# Patient Record
Sex: MALE | Employment: FULL TIME | URBAN - METROPOLITAN AREA
[De-identification: names, ages, dates, MRNs, and addresses within clinical notes are randomized per-mention and may not be internally consistent; named-entity substitution may affect disease eponyms.]

---

## 2017-05-17 ENCOUNTER — TELEPHONE (OUTPATIENT)
Dept: NEUROLOGY | Facility: CLINIC | Age: 47
End: 2017-05-17

## 2017-05-18 ENCOUNTER — TELEPHONE (OUTPATIENT)
Dept: NEUROLOGY | Facility: CLINIC | Age: 47
End: 2017-05-18

## 2017-05-18 ENCOUNTER — OFFICE VISIT (OUTPATIENT)
Dept: NEUROLOGY | Facility: CLINIC | Age: 47
End: 2017-05-18

## 2017-05-18 VITALS
BODY MASS INDEX: 29.82 KG/M2 | RESPIRATION RATE: 18 BRPM | SYSTOLIC BLOOD PRESSURE: 144 MMHG | HEIGHT: 73 IN | DIASTOLIC BLOOD PRESSURE: 68 MMHG | HEART RATE: 76 BPM | WEIGHT: 225 LBS | OXYGEN SATURATION: 96 %

## 2017-05-18 DIAGNOSIS — M50.90 CERVICAL DISC DISEASE: ICD-10-CM

## 2017-05-18 DIAGNOSIS — M47.892 OTHER OSTEOARTHRITIS OF SPINE, CERVICAL REGION: ICD-10-CM

## 2017-05-18 DIAGNOSIS — M54.12 CERVICAL RADICULOPATHY: ICD-10-CM

## 2017-05-18 DIAGNOSIS — M54.2 NECK PAIN: Primary | ICD-10-CM

## 2017-05-18 PROBLEM — M47.812 CERVICAL SPONDYLOSIS: Status: ACTIVE | Noted: 2017-05-18

## 2017-05-18 PROCEDURE — 99204 OFFICE O/P NEW MOD 45 MIN: CPT | Performed by: PHYSICAL MEDICINE & REHABILITATION

## 2017-05-18 RX ORDER — IBUPROFEN 600 MG/1
600 TABLET ORAL EVERY 6 HOURS PRN
COMMUNITY

## 2017-05-18 RX ORDER — ALBUTEROL SULFATE 90 UG/1
AEROSOL, METERED RESPIRATORY (INHALATION) EVERY 6 HOURS PRN
COMMUNITY

## 2017-05-18 NOTE — TELEPHONE ENCOUNTER
AIM on-line for Authorization of Approval for MRI C-spine, Approval was given with Auth #[id number] valid until 06/16/2017, will call patient to inform of the Approval for PT / MRI, patient stated that he would like try PT first and see if there's improveme

## 2017-05-18 NOTE — PATIENT INSTRUCTIONS
Refill policies:    • Allow 2 business days for refills; controlled substances may take longer.   • Contact your pharmacy at least 5 days prior to running out of medication and have them send an electronic request or submit request through the “request re your physician has recommended that you have a procedure or additional testing performed. [facility] will contact your insurance carrier to obtain pre-certification or prior authorization.     Unfortunately, SAIMA has seen an increas
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
## 2017-08-24 ENCOUNTER — OFFICE VISIT (OUTPATIENT)
Dept: NEUROLOGY | Facility: CLINIC | Age: 47
End: 2017-08-24

## 2017-08-24 VITALS
DIASTOLIC BLOOD PRESSURE: 78 MMHG | OXYGEN SATURATION: 93 % | RESPIRATION RATE: 18 BRPM | HEIGHT: 73 IN | BODY MASS INDEX: 30.22 KG/M2 | WEIGHT: 228 LBS | SYSTOLIC BLOOD PRESSURE: 120 MMHG | HEART RATE: 78 BPM

## 2017-08-24 DIAGNOSIS — M50.90 CERVICAL DISC DISEASE: Primary | ICD-10-CM

## 2017-08-24 DIAGNOSIS — M47.892 OTHER OSTEOARTHRITIS OF SPINE, CERVICAL REGION: ICD-10-CM

## 2017-08-24 DIAGNOSIS — M54.12 CERVICAL RADICULOPATHY: ICD-10-CM

## 2017-08-24 PROCEDURE — 99214 OFFICE O/P EST MOD 30 MIN: CPT | Performed by: PHYSICAL MEDICINE & REHABILITATION

## 2017-08-24 RX ORDER — TRAMADOL HYDROCHLORIDE 50 MG/1
50 TABLET ORAL EVERY 8 HOURS PRN
Qty: 30 TABLET | Refills: 0 | Status: SHIPPED | OUTPATIENT
Start: 2017-08-24 | End: 2017-09-28

## 2017-08-24 NOTE — PATIENT INSTRUCTIONS
Refill policies:    • Allow 2 business days for refills; controlled substances may take longer.   • Contact your pharmacy at least 5 days prior to running out of medication and have them send an electronic request or submit request through the “request re your physician has recommended that you have a procedure or additional testing performed. [facility] will contact your insurance carrier to obtain pre-certification or prior authorization.     Unfortunately, SAIMA has seen an increas

## 2017-08-24 NOTE — PROGRESS NOTES
HPI:   Chirag Ferrell is a 52year old male. Patient presents with:  Neck Pain: LOV 05/18/17 Pt having right sided neck pain. Pt stated that it feels like someone stabbing him. Pt right shoulder strain in June., limited motion now improving.  Pt taking ibup and vomiting. Genitourinary:        Denies any loss of bowel or bladder     Musculoskeletal: Positive for neck pain. Skin: Negative for itching and rash. Neurological: Negative for tingling and sensory change.    Psychiatric/Behavioral: Negative for d There are no rashes or lesions. Lymph Nodes:  The patient has no palpable submandibular, supraclavicular, and cervical lymph nodes    Cervical Spine:    Posture: mild chin forward superiorly rotated protracted shoulder posture.    Shoulders: Level   Head months    Spent 35 minutes face to face    Discussed plan with Beau Dorman and understands the plan, patient has no further questions.      JUAN Shields  8/24/2017  4:44 PM

## 2017-08-25 ENCOUNTER — TELEPHONE (OUTPATIENT)
Dept: NEUROLOGY | Facility: CLINIC | Age: 47
End: 2017-08-25

## 2017-08-25 NOTE — TELEPHONE ENCOUNTER
AIM Online for authorization of approval for MRI C-spine wo. Approval was given with Authorization # [id number] effective 08/25/17 to 09/25/17. Will call Pt. to inform. Pt. Informed of approval. Scheduled MRI on 09/06/17 at [facility].

## 2017-09-06 ENCOUNTER — TELEPHONE (OUTPATIENT)
Dept: NEUROLOGY | Facility: CLINIC | Age: 47
End: 2017-09-06

## 2017-09-06 ENCOUNTER — HOSPITAL ENCOUNTER (OUTPATIENT)
Dept: MRI IMAGING | Age: 47
Discharge: HOME | End: 2017-09-06
Attending: NURSE PRACTITIONER
Payer: COMMERCIAL

## 2017-09-06 DIAGNOSIS — M47.892 OTHER OSTEOARTHRITIS OF SPINE, CERVICAL REGION: ICD-10-CM

## 2017-09-06 DIAGNOSIS — M50.90 CERVICAL DISC DISEASE: ICD-10-CM

## 2017-09-06 DIAGNOSIS — M54.12 CERVICAL RADICULOPATHY: ICD-10-CM

## 2017-09-06 PROCEDURE — 72141 MRI NECK SPINE W/O DYE: CPT | Performed by: NURSE PRACTITIONER

## 2017-09-08 PROBLEM — M48.02 FORAMINAL STENOSIS OF CERVICAL REGION: Status: ACTIVE | Noted: 2017-09-08

## 2017-09-09 NOTE — TELEPHONE ENCOUNTER
I reviewed his MRI scan and he has multiple bulging discs which compromise the holes where the nerves exit his neck to go into his arms, right, more than left. I would like to see how he does with the PT.   He should call the office and let us know how he

## 2017-09-11 NOTE — TELEPHONE ENCOUNTER
Left detailed message informing patient of the below results and recommendations. Patient instructed to call back after 3-4 weeks of PT.  (HIPAA verified)

## 2017-09-28 RX ORDER — TRAMADOL HYDROCHLORIDE 50 MG/1
50 TABLET ORAL DAILY PRN
Qty: 30 TABLET | Refills: 0 | OUTPATIENT
Start: 2017-09-28 | End: 2017-12-14

## 2017-09-28 NOTE — TELEPHONE ENCOUNTER
Medication request: Tramadol 50mg daily prn pain    LOV 8/24/17  NOV 12/14/17    [place]8/26/17 #30

## 2017-09-29 ENCOUNTER — TELEPHONE (OUTPATIENT)
Dept: NEUROLOGY | Facility: CLINIC | Age: 47
End: 2017-09-29

## 2017-12-14 ENCOUNTER — OFFICE VISIT (OUTPATIENT)
Dept: NEUROLOGY | Facility: CLINIC | Age: 47
End: 2017-12-14

## 2017-12-14 VITALS
RESPIRATION RATE: 18 BRPM | BODY MASS INDEX: 30 KG/M2 | DIASTOLIC BLOOD PRESSURE: 74 MMHG | WEIGHT: 228 LBS | HEART RATE: 74 BPM | SYSTOLIC BLOOD PRESSURE: 140 MMHG

## 2017-12-14 DIAGNOSIS — M25.511 CHRONIC RIGHT SHOULDER PAIN: ICD-10-CM

## 2017-12-14 DIAGNOSIS — M47.892 OTHER OSTEOARTHRITIS OF SPINE, CERVICAL REGION: ICD-10-CM

## 2017-12-14 DIAGNOSIS — G89.29 CHRONIC RIGHT SHOULDER PAIN: ICD-10-CM

## 2017-12-14 DIAGNOSIS — M48.02 FORAMINAL STENOSIS OF CERVICAL REGION: Primary | ICD-10-CM

## 2017-12-14 DIAGNOSIS — M75.111 INCOMPLETE TEAR OF RIGHT ROTATOR CUFF: ICD-10-CM

## 2017-12-14 DIAGNOSIS — M50.90 CERVICAL DISC DISEASE: ICD-10-CM

## 2017-12-14 PROCEDURE — 99214 OFFICE O/P EST MOD 30 MIN: CPT | Performed by: PHYSICAL MEDICINE & REHABILITATION

## 2017-12-14 PROCEDURE — 76881 US COMPL JOINT R-T W/IMG: CPT | Performed by: PHYSICAL MEDICINE & REHABILITATION

## 2017-12-14 RX ORDER — RANITIDINE 150 MG/1
150 TABLET ORAL AS NEEDED
COMMUNITY
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
## 2017-12-14 NOTE — PROCEDURES
I did an ultrasound examination of the right shoulder in the office today:   Biceps Tendon:   Mild-moderate fluid around the tendon with negative power doppler imaging  Subscapularis Tendon:  Mild bursal surface partial thickness tear with a small spur  AC

## 2017-12-14 NOTE — PATIENT INSTRUCTIONS
As of October 6th 2014, the Drug Enforcement Agency [facility]) is reclassifying all hydrocodone combination medications from Schedule III to Schedule II. This includes medications such as Norco, Vicodin, Lortab, Zohydro, and Vicoprofen.     What this means for y did a diagnostic ultrasound of the right shoulder in the office today. (see procedure note)    He will start PT on the right shoulder. He will continue with the Ibuprofen as needed for the pain. He will follow up in 3 months.     The patient does not

## 2018-03-15 ENCOUNTER — OFFICE VISIT (OUTPATIENT)
Dept: NEUROLOGY | Facility: CLINIC | Age: 48
End: 2018-03-15

## 2018-03-15 VITALS
RESPIRATION RATE: 18 BRPM | BODY MASS INDEX: 30 KG/M2 | SYSTOLIC BLOOD PRESSURE: 126 MMHG | DIASTOLIC BLOOD PRESSURE: 80 MMHG | WEIGHT: 230 LBS | HEART RATE: 81 BPM

## 2018-03-15 DIAGNOSIS — M48.02 FORAMINAL STENOSIS OF CERVICAL REGION: ICD-10-CM

## 2018-03-15 DIAGNOSIS — M75.111 INCOMPLETE TEAR OF RIGHT ROTATOR CUFF: ICD-10-CM

## 2018-03-15 DIAGNOSIS — M50.90 CERVICAL DISC DISEASE: Primary | ICD-10-CM

## 2018-03-15 DIAGNOSIS — G89.29 CHRONIC RIGHT SHOULDER PAIN: ICD-10-CM

## 2018-03-15 DIAGNOSIS — M25.511 CHRONIC RIGHT SHOULDER PAIN: ICD-10-CM

## 2018-03-15 PROCEDURE — 99214 OFFICE O/P EST MOD 30 MIN: CPT | Performed by: PHYSICAL MEDICINE & REHABILITATION

## 2018-03-15 NOTE — PROGRESS NOTES
Cervical Pain H & P    Chief Complaint:  Patient presents with:  Shoulder Pain: pt here for follow up with c/o right shoulder pain that is mild rotating shoulder causes \"popping sound\". LOV 12/14/17.  did not complete PT due to work schedule, does weight alert and oriented x 3. The patient has a normal affect and mood. Respiratory:  No acute respiratory distress. Patient does not have a cough. HEENT:  Extraocular muscles are intact. There is no kern icterus.  Pupils are equal, round, and reactive t bilateral mod, C6-7 right mod foraminal stenosis    3.  Chronic right shoulder pain    4. right subscapularis mild inferior bursal surface tear with spur, infraspinatus mild bursal surface tear with spur, supraspinatus anterior moderate full thickness & pos

## 2018-10-10 ENCOUNTER — TELEPHONE (OUTPATIENT)
Dept: NEUROLOGY | Facility: CLINIC | Age: 48
End: 2018-10-10

## 2018-10-10 NOTE — TELEPHONE ENCOUNTER
Patient states he has been experiencing numbness in the teeth after hitting head while at work one week ago. Patient states the day after the injury his top teeth on right side mouth went numb and there was a headache with mild neck pain.  Still has intermi

## 2018-10-14 ENCOUNTER — TELEPHONE (OUTPATIENT)
Dept: NEUROLOGY | Facility: CLINIC | Age: 48
End: 2018-10-14

## 2018-10-14 NOTE — TELEPHONE ENCOUNTER
The patient paged me stating that he is having a severe headache that started 10 days ago. About 13 days ago, he was going up and down a ladder and felt a crunch in his neck.   The headaches started a few days later and has been getting progressively worse

## 2018-10-15 NOTE — TELEPHONE ENCOUNTER
Pt called per Dr Clements Nurse request. Nirmal Ramirez stated that he went to [facility] and had CT of head showing no tumor or fracture. CT showed cervical arthritis, degenerative discs. Pt has copy of CT.  Pt stated that the headache that was 10/10 on Sunday is 2/10 at p

## 2023-08-25 ENCOUNTER — TELEPHONE (OUTPATIENT)
Dept: NEUROLOGY | Facility: CLINIC | Age: 53
End: 2023-08-25

## 2023-09-12 ENCOUNTER — MED REC SCAN ONLY (OUTPATIENT)
Dept: PHYSICAL MEDICINE AND REHAB | Facility: CLINIC | Age: 53
End: 2023-09-12

## 2023-12-13 ENCOUNTER — OFFICE VISIT (OUTPATIENT)
Dept: PHYSICAL MEDICINE AND REHAB | Facility: CLINIC | Age: 53
End: 2023-12-13
Payer: COMMERCIAL

## 2023-12-13 VITALS — BODY MASS INDEX: 30.48 KG/M2 | HEIGHT: 72 IN | WEIGHT: 225 LBS

## 2023-12-13 DIAGNOSIS — M54.16 LUMBAR RADICULOPATHY: Primary | ICD-10-CM

## 2023-12-13 DIAGNOSIS — M51.9 LUMBAR DISC DISEASE: ICD-10-CM

## 2023-12-13 DIAGNOSIS — M48.061 SPINAL STENOSIS OF LUMBAR REGION WITHOUT NEUROGENIC CLAUDICATION: ICD-10-CM

## 2023-12-13 DIAGNOSIS — M96.1 LUMBAR POST-LAMINECTOMY SYNDROME: ICD-10-CM

## 2023-12-13 DIAGNOSIS — M51.26 LUMBAR HERNIATED DISC: ICD-10-CM

## 2023-12-13 DIAGNOSIS — M48.061 LUMBAR FORAMINAL STENOSIS: ICD-10-CM

## 2023-12-13 PROCEDURE — 99204 OFFICE O/P NEW MOD 45 MIN: CPT | Performed by: PHYSICAL MEDICINE & REHABILITATION

## 2023-12-13 PROCEDURE — 3008F BODY MASS INDEX DOCD: CPT | Performed by: PHYSICAL MEDICINE & REHABILITATION

## 2023-12-13 RX ORDER — CETIRIZINE HYDROCHLORIDE 10 MG/1
10 TABLET ORAL DAILY
COMMUNITY

## 2023-12-13 RX ORDER — OMEPRAZOLE 10 MG/1
10 CAPSULE, DELAYED RELEASE ORAL
COMMUNITY

## 2023-12-13 RX ORDER — MONTELUKAST SODIUM 10 MG/1
1 TABLET ORAL EVERY EVENING
COMMUNITY

## 2023-12-13 RX ORDER — FLUTICASONE PROPIONATE AND SALMETEROL 250; 50 UG/1; UG/1
1 POWDER RESPIRATORY (INHALATION)
COMMUNITY

## 2023-12-13 NOTE — PROGRESS NOTES
Low Back Pain H & P    Chief Complaint:    Chief Complaint   Patient presents with    Low Back Pain     LOV 3/15/2018 Patient here for slipped disc at L5-S1. MRI 8/2023. Received  RUBY 8/8 and 10/15 from [address] [place] [address]. Had microdiscectomy 10/30, which has provided relief of his back pain. C/o numbness in his left leg/foot that has not been resolved with this. Is back at work however still has trouble walking due to his leg numbness. Was given gabapentin by the surgeon and took it for about 5-6 weeks and didn't notice any improvement from this so discontinued. LOP 0/10       HPI:  Gerardo Mcdonnell is a 48year old year old right handed male with a prior history of low back pain. On 8/19/1970, he acutely developed right low back and posterior leg pain. He was not able to see me so he ended up seeing Dr. Romero Zacarias and had a prednisone taper and a MRI. He had 2 lumbar RUBY's. The first one resolved the pain and he was left with numbness. He had a second injection did not help with the numbness and his back was still getting stuck at times. He then had a L5-S1 microdiscectomy on 10/30/2023. Since the surgery, his spine movement is better and his back is not getting stuck, but he elder have back stiffness. Prior to the surgery, the numbness was constant and it is still constant but it now varies in intensity. The numbness is less in the morning and worsens as the day goes on. He is now cleared to go back to the PT, but he has not started it yet. Description of the Pain  He ss not having any low back pain now. He did have stabbing and burning pain in the right posterior leg prior to the surgery. There is numbness in the right buttock, right posterior thigh, right posterior lower leg, right lateral foot, and right heel. There is no tingling in the legs. There is weakness in the right leg. This has not changed since the surgery.     Past Medical History   Past Medical History:   Diagnosis Date    Asthma     Heartburn Past Surgical History   History reviewed. No pertinent surgical history. Family History   Family History   Problem Relation Age of Onset    Diabetes Father     Diabetes Mother        Social History   Social History     Socioeconomic History    Marital status:      Spouse name: Not on file    Number of children: Not on file    Years of education: Not on file    Highest education level: Not on file   Occupational History    Occupation: contractor   Tobacco Use    Smoking status: Former    Smokeless tobacco: Current     Types: Chew   Substance and Sexual Activity    Alcohol use: Yes     Alcohol/week: 2.0 standard drinks of alcohol     Types: 2 Shots of liquor per week     Comment: socially    Drug use: No    Sexual activity: Not on file   Other Topics Concern    Caffeine Concern Yes     Comment: coffee    Exercise Yes     Comment: biking    Seat Belt Not Asked    Special Diet Not Asked    Stress Concern Not Asked    Weight Concern Not Asked     Service Not Asked    Blood Transfusions Not Asked    Occupational Exposure Not Asked    Hobby Hazards Not Asked    Sleep Concern Not Asked    Back Care Not Asked    Bike Helmet Not Asked    Self-Exams Not Asked   Social History Narrative    The patient does not use an assistive device. .      The patient does live in a home with stairs. Social Determinants of Health     Financial Resource Strain: Not on file   Food Insecurity: Not on file   Transportation Needs: Not on file   Physical Activity: Not on file   Stress: Not on file   Social Connections: Not on file   Housing Stability: Not on file       Review of Systems  Review of Systems   Constitutional:  Positive for weight loss. He lost weight on purpose to control his blood sugars. HENT: Negative. Eyes: Negative. Respiratory: Negative. Cardiovascular: Negative. Gastrointestinal: Negative. Genitourinary: Negative. Musculoskeletal: Negative. Skin: Negative. Neurological: Negative. Endo/Heme/Allergies: Negative. Psychiatric/Behavioral: Negative. All other systems reviewed and are negative. PE:  The patient does appear in his stated age in no distress. The patient is well groomed. Psychiatric:  The patient is alert and oriented x 3. The patient has a normal affect and mood. Respiratory:  No acute respiratory distress. Patient does not have a cough. HEENT:  Extraocular muscles are intact. There is no kern icterus. Pupils are equal, round, and reactive to light. No redness or discharge bilaterally. Skin:  There are no rashes or lesions. Surgical scars are healed. Vitals: There were no vitals filed for this visit. Gait:    Gait: Normal gait   Sit to Stand: no difficulty   RIGHT Walking on Toes: no difficulty   LEFT Walking on Toes: no difficulty   RIGHT Walking on Heels: no difficulty   LEFT Walking on Heels: no difficulty     Lumbar Spine:    Scoliosis: Thoracic dextroscoliosis that is mild-moderate, Lumbar levoscoliosis that is mild-moderate, and Left shift that is mild-moderate   Lumbar Flexion: 80 degrees Painless, but popliteal fossa stretching   Lumbar Extension: 10 degrees Painless     Lumbar Spine Palpation:    Spinous Processes: Non-tender for all Spinous Processes   Z-joints: Non-tender for all Z-joints   SIJ: Non-tender for bilateral SIJ   Piriformis Muscle: Non-tender bilateral Piriformis muscles   Greater Trochanteric Bursa: Non-tender for bilateral Greater Trochanteric Bursa     Vascular lower extremity:   Dorsalis pedis pulse-RIGHT 2+   Dorsalis pedis pulse-LEFT 2+   Tibialis posterior pulse-RIGHT 2+   Tibialis posterior pulse-LEFT 2+      Neurological Lower Extremity:    Light Touch Sensation: Intact in bilateral LE except:  Decreased in the  posterior Right thigh   lateral RIGHT foot   LE Muscle Strength:  All LE strength measurements 5/5   RIGHT plantar reflexes: downward response   LEFT plantar reflexes: downward response   Reflexes: 2+ in bilateral lower extremities except:  Right Achilles tendon which are absent     Hip: Hips are stable. RIGHT hip ROM normal   LEFT hip ROM normal   RIGHT hip flexion Negative pain   LEFT hip flexion Negative pain   RIGHT hip NETTE test Negative for pain   LEFT hip NETTE test Negative for pain   RIGHT hip internal rotation Negative for pain   LEFT hip internal rotation Negative for pain   RIGHT hip piriformis stretch test Negative for pain   LEFT hip piriformis stretch test Negative for pain     Neural Tension Tests Lumbar Spine:  Sitting straight leg raise-RIGHT Negative for pain   Sitting straight leg raise-LEFT Negative for pain   Supine straight leg raise-RIGHT Negative for pain   Supine straight leg raise-LEFT Negative for pain   Slump test-RIGHT Negative for pain   Slump test-LEFT Negative for pain     Lymph Nodes:   Inguinal Lymph Nodes Absent     Radiology Imaging:  I reviewed with the patient his MRI of the lumbar spine from 8/24/2023. Assessment  1. righ S1 radiculopathy    2. T12-L1 right mild-moderate, L1-2 mild central herniated disc    3. L5-S1 right moderate, L4-5 left mild, L3-4 left mild foraminal stenosis    4. L5-S1 right mod far lateral, L4-5 mod diffuse & left mod-large far lateral, L3-4 mild-mod diffuse & left mod far lateral, L2-3 mild-mod diffuse, L1-2 mild-mod diffuse bulging discs    5. L4-5 mod, L3-4 mod central stenosis    6. Lumbar post-laminectomy syndrome: L5-S1 right discectomy and laminectomy      Plan  He will get into the PT for the lumbar spine. The patient does not need any pain medications at this time. The patient does not need any injections at this time. I will hold on doing an EMG of the right leg for now. He will follow up in 3 months or sooner if needed. The patient understands and agrees with the stated plan. Ahsan Edwards MD  12/13/2023

## 2023-12-13 NOTE — PATIENT INSTRUCTIONS
Plan  He will get into the PT for the lumbar spine. The patient does not need any pain medications at this time. The patient does not need any injections at this time. I will hold on doing an EMG of the right leg for now. He will follow up in 3 months or sooner if needed.

## 2024-01-11 ENCOUNTER — MED REC SCAN ONLY (OUTPATIENT)
Dept: PHYSICAL MEDICINE AND REHAB | Facility: CLINIC | Age: 54
End: 2024-01-11

## 2024-02-21 ENCOUNTER — MED REC SCAN ONLY (OUTPATIENT)
Dept: PHYSICAL MEDICINE AND REHAB | Facility: CLINIC | Age: 54
End: 2024-02-21

## 2024-03-19 ENCOUNTER — MED REC SCAN ONLY (OUTPATIENT)
Dept: PHYSICAL MEDICINE AND REHAB | Facility: CLINIC | Age: 54
End: 2024-03-19

## 2024-03-20 ENCOUNTER — OFFICE VISIT (OUTPATIENT)
Dept: PHYSICAL MEDICINE AND REHAB | Facility: CLINIC | Age: 54
End: 2024-03-20
Payer: COMMERCIAL

## 2024-03-20 VITALS — HEIGHT: 72 IN | WEIGHT: 225 LBS | OXYGEN SATURATION: 97 % | BODY MASS INDEX: 30.48 KG/M2 | HEART RATE: 77 BPM

## 2024-03-20 DIAGNOSIS — M51.9 LUMBAR DISC DISEASE: ICD-10-CM

## 2024-03-20 DIAGNOSIS — M96.1 LUMBAR POST-LAMINECTOMY SYNDROME: ICD-10-CM

## 2024-03-20 DIAGNOSIS — M51.26 LUMBAR HERNIATED DISC: ICD-10-CM

## 2024-03-20 DIAGNOSIS — M48.061 LUMBAR FORAMINAL STENOSIS: ICD-10-CM

## 2024-03-20 DIAGNOSIS — M48.061 SPINAL STENOSIS OF LUMBAR REGION WITHOUT NEUROGENIC CLAUDICATION: Primary | ICD-10-CM

## 2024-03-20 DIAGNOSIS — M54.16 LUMBAR RADICULOPATHY: ICD-10-CM

## 2024-03-20 PROCEDURE — 99214 OFFICE O/P EST MOD 30 MIN: CPT | Performed by: PHYSICAL MEDICINE & REHABILITATION

## 2024-03-20 PROCEDURE — 3008F BODY MASS INDEX DOCD: CPT | Performed by: PHYSICAL MEDICINE & REHABILITATION

## 2024-03-20 RX ORDER — NALTREXONE 100 %
POWDER (GRAM) MISCELLANEOUS
Qty: 30 EACH | Refills: 3 | Status: SHIPPED | OUTPATIENT
Start: 2024-03-20

## 2024-03-20 NOTE — PATIENT INSTRUCTIONS
Plan  The patient will continue with his home exercise program.    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  He will take this for 2-4 weeks.    He will follow up in 3 months or sooner if needed.

## 2024-03-20 NOTE — PROGRESS NOTES
Low Back Pain H & P    Chief Complaint:   Chief Complaint   Patient presents with    Follow - Up     LOV 12/13/23 pt is here for a follow up. Daily N/T throughout the right leg, started at back of calf and to the toes. Completed PT.  Currently not taking pain meds or muscle relaxers.  States he feels the numbness getting better but it still there. Pain 0/10     Nursing note reviewed and verified.    Patient was last seen on 12/13/2023.  He has completed the PT with Miguel and he is back to the gym working out and riding his bike.  He is feeling much better now.  He is now 60-70% better.  He is able to lift the weights the that he wants.  He is not able to run fast or do a right single leg calf raise more than a few times.  He was diagnosed with diabetes and had the flu and then he had a bacterial infection.    Past Medical History   Past Medical History:   Diagnosis Date    Asthma (HCC)     Heartburn        Past Surgical History   History reviewed. No pertinent surgical history.    Family History   Family History   Problem Relation Age of Onset    Diabetes Father     Diabetes Mother        Social History   Social History     Socioeconomic History    Marital status:      Spouse name: Not on file    Number of children: Not on file    Years of education: Not on file    Highest education level: Not on file   Occupational History    Occupation: contractor   Tobacco Use    Smoking status: Former    Smokeless tobacco: Current     Types: Chew   Substance and Sexual Activity    Alcohol use: Yes     Alcohol/week: 2.0 standard drinks of alcohol     Types: 2 Shots of liquor per week     Comment: socially    Drug use: No    Sexual activity: Not on file   Other Topics Concern    Caffeine Concern Yes     Comment: coffee    Exercise Yes     Comment: biking    Seat Belt Not Asked    Special Diet Not Asked    Stress Concern Not Asked    Weight Concern Not Asked     Service Not Asked    Blood Transfusions Not Asked     Occupational Exposure Not Asked    Hobby Hazards Not Asked    Sleep Concern Not Asked    Back Care Not Asked    Bike Helmet Not Asked    Self-Exams Not Asked   Social History Narrative    The patient does not use an assistive device..      The patient does live in a home with stairs.     Social Determinants of Health     Financial Resource Strain: Not on file   Food Insecurity: Not on file   Transportation Needs: Not on file   Physical Activity: Not on file   Stress: Not on file   Social Connections: Not on file   Housing Stability: Not on file       PE:  The patient does appear in his stated age in no distress.  The patient is well groomed.    Psychiatric:  The patient is alert and oriented x 3.  The patient has a normal affect and mood.      Respiratory:  No acute respiratory distress. Patient does not have a cough.    HEENT:  Extraocular muscles are intact. There is no kern icterus. Pupils are equal, round, and reactive to light. No redness or discharge bilaterally.    Skin:  There are no rashes or lesions.    Vitals:  Vitals:    03/20/24 0859   Pulse: 77       Gait:    Gait: Normal gait   Sit to Stand: no difficulty      Lumbar Spine:    Scoliosis: No scoliosis present     Vascular lower extremity:   Dorsalis pedis pulse-RIGHT 2+   Dorsalis pedis pulse-LEFT 2+   Tibialis posterior pulse-RIGHT 2+   Tibialis posterior pulse-LEFT 2+     Neurological Lower Extremity:    Light Touch Sensation: Intact in bilateral Lower Extremities   LE Muscle Strength: All LE strength measurements 5/5 except:  Hamstring RIGHT:   4+/5   RIGHT plantar reflexes: downward response   LEFT plantar reflexes: downward response   Reflexes: 2+ in bilateral lower extremities except:  Right Achilles tendon which are absent     Special Tests Lumbar Spine:  The patient is unable to perform a set of 10 heel raises on the right leg.  The patient is able to perform heel raises with no difficulty on the left leg.    Assessment  1. L4-5 mod, L3-4 mod  central stenosis    2. L5-S1 right mod far lateral, L4-5 mod diffuse & left mod-large far lateral, L3-4 mild-mod diffuse & left mod far lateral, L2-3 mild-mod diffuse, L1-2 mild-mod diffuse bulging discs    3. L5-S1 right moderate, L4-5 left mild, L3-4 left mild foraminal stenosis    4. T12-L1 right mild-moderate, L1-2 mild central herniated disc    5. Lumbar post-laminectomy syndrome: L5-S1 right discectomy and laminectomy    6. right S1 radiculopathy         Plan  The patient will continue with his home exercise program.    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  He will take this for 2-4 weeks.    He will follow up in 3 months or sooner if needed.    The total office visit face-to-face visit time was at least 30 minutes with over half of the time spent discussing patient care and treatment.    The patient understands and agrees with the stated plan.  Elijah Bonner MD  3/20/2024

## 2024-07-08 ENCOUNTER — OFFICE VISIT (OUTPATIENT)
Dept: PHYSICAL MEDICINE AND REHAB | Facility: CLINIC | Age: 54
End: 2024-07-08
Payer: COMMERCIAL

## 2024-07-08 VITALS — OXYGEN SATURATION: 96 % | HEART RATE: 76 BPM

## 2024-07-08 DIAGNOSIS — M51.9 LUMBAR DISC DISEASE: ICD-10-CM

## 2024-07-08 DIAGNOSIS — M48.061 LUMBAR FORAMINAL STENOSIS: ICD-10-CM

## 2024-07-08 DIAGNOSIS — M51.26 LUMBAR HERNIATED DISC: ICD-10-CM

## 2024-07-08 DIAGNOSIS — M48.061 SPINAL STENOSIS OF LUMBAR REGION WITHOUT NEUROGENIC CLAUDICATION: Primary | ICD-10-CM

## 2024-07-08 DIAGNOSIS — M54.16 LUMBAR RADICULOPATHY: ICD-10-CM

## 2024-07-08 DIAGNOSIS — M96.1 LUMBAR POST-LAMINECTOMY SYNDROME: ICD-10-CM

## 2024-07-08 PROCEDURE — 99214 OFFICE O/P EST MOD 30 MIN: CPT | Performed by: PHYSICAL MEDICINE & REHABILITATION

## 2024-07-08 NOTE — PATIENT INSTRUCTIONS
Plan  The patient will continue with his home exercise program.    He will continue with his bike riding.    He is not interested in trying Naltrexone at 2 mg a day at this time to see if it will help his horse voice that developed post viral.    He will look into trying Berberine for his diabetes.    He will continue with his other supplements for now.    He will follow up in 6 months or sooner if needed.

## 2024-07-08 NOTE — PROGRESS NOTES
Low Back Pain H & P    Chief Complaint:   Chief Complaint   Patient presents with    Low Back Pain     03/20/24 LOV. Patient took the naltrexone for a month and states it did not help him. Admits he is not doing HEP but he rides his bike. Numbness in R foot. No pain. Pain when bending at the waist and lifting something heavy.      Nursing note reviewed and verified.    Patient was last seen on 3/20/2024.  He still has the numbness in the right posterior leg, but there is pain if he pinches himself which is new for him.  He will occasionally get stuck becoming erect after bending over.  If he raises his right leg, this pain will resolve.  He has occasional low back cracking.    His blood sugars are still running high but the cholesterol is lower now.      The Naltrexone did not affect his voice.  He only took 1 mg a day though.    He went to [employer] and did a lot of walking without issue.    Past Medical History   Past Medical History:    Asthma (HCC)    Heartburn       Past Surgical History   No past surgical history on file.    Family History   Family History   Problem Relation Age of Onset    Diabetes Father     Diabetes Mother        Social History   Social History     Socioeconomic History    Marital status:      Spouse name: Not on file    Number of children: Not on file    Years of education: Not on file    Highest education level: Not on file   Occupational History    Occupation: contractor   Tobacco Use    Smoking status: Former    Smokeless tobacco: Current     Types: Chew   Substance and Sexual Activity    Alcohol use: Yes     Alcohol/week: 2.0 standard drinks of alcohol     Types: 2 Shots of liquor per week     Comment: socially    Drug use: No    Sexual activity: Not on file   Other Topics Concern    Caffeine Concern Yes     Comment: coffee    Exercise Yes     Comment: biking    Seat Belt Not Asked    Special Diet Not Asked    Stress Concern Not Asked    Weight Concern Not Asked      Service Not Asked    Blood Transfusions Not Asked    Occupational Exposure Not Asked    Hobby Hazards Not Asked    Sleep Concern Not Asked    Back Care Not Asked    Bike Helmet Not Asked    Self-Exams Not Asked   Social History Narrative    The patient does not use an assistive device..      The patient does live in a home with stairs.     Social Determinants of Health     Financial Resource Strain: Not on file   Food Insecurity: Not on file   Transportation Needs: Not on file   Physical Activity: Not on file   Stress: Not on file   Social Connections: Not on file   Housing Stability: At Risk (8/18/2023)    Received from [place]     Living Situation: Not on file     Housing Problems: Not on file       PE:  The patient does appear in his stated age in no distress.  The patient is well groomed.    Psychiatric:  The patient is alert and oriented x 3.  The patient has a normal affect and mood.      Respiratory:  No acute respiratory distress. Patient does not have a cough.    HEENT:  Extraocular muscles are intact. There is no kern icterus. Pupils are equal, round, and reactive to light. No redness or discharge bilaterally.    Skin:  There are no rashes or lesions.    Vitals:  Vitals:    07/08/24 0844   Pulse: 76       Gait:    Gait: Normal gait   Sit to Stand: no difficulty      Vascular lower extremity:   Dorsalis pedis pulse-RIGHT 2+   Dorsalis pedis pulse-LEFT 2+   Tibialis posterior pulse-RIGHT 2+   Tibialis posterior pulse-LEFT 2+     Neurological Lower Extremity:    Light Touch Sensation: Intact in bilateral LE except:  Decreased in the  lateral Right Lower Leg   LE Muscle Strength: All LE strength measurements 5/5   RIGHT plantar reflexes: downward response   LEFT plantar reflexes: downward response   Reflexes: 2+ in bilateral lower extremities except:  Right Achilles tendon which are absent     Assessment  1. L4-5 mod, L3-4 mod central stenosis    2. L5-S1 right mod far  lateral, L4-5 mod diffuse & left mod-large far lateral, L3-4 mild-mod diffuse & left mod far lateral, L2-3 mild-mod diffuse, L1-2 mild-mod diffuse bulging discs    3. L5-S1 right moderate, L4-5 left mild, L3-4 left mild foraminal stenosis    4. right S1 radiculopathy    5. T12-L1 right mild-moderate, L1-2 mild central herniated disc    6. Lumbar post-laminectomy syndrome: L5-S1 right discectomy and laminectomy         Plan  The patient will continue with his home exercise program.    He will continue with his bike riding.    He is not interested in trying Naltrexone at 2 mg a day at this time to see if it will help his horse voice that developed post viral.    He will look into trying Berberine for his diabetes.    He will continue with his other supplements for now.    He will follow up in 6 months or sooner if needed.    The patient understands and agrees with the stated plan.  Elijah Bonner MD  7/8/2024

## (undated) NOTE — LETTER
12/14/2017      Kamlesh Rueda MD  Physical Medicine and Rehabilitation  [address] [address]  Dept: [phone]  Dept Fax: [phone]        RE: Consultation for Candelaria Hansen        Dear Madelin Curry,    Thank you very mu

## (undated) NOTE — LETTER
8/25/2017      Clark Moore MD  Physical Medicine and Rehabilitation  [address] [address]  Dept: [phone]  Dept Fax: [phone]        RE: Consultation for Kristian Chauhan        Dear Norberto Head,    Thank you very muc

## (undated) NOTE — LETTER
3/20/2024      Elijah Bonner MD  Physical Medicine and Rehabilitation  [address] [place] [address], [address]  [facility] [address]  Dept: [phone]  Dept Fax: [phone]        RE: Consultation for Greg Soler        Dear Ayush Montes,    Thank you very much for the opportunity to see your patient.  Attached please find a summary from your patient's recent visit.     I appreciate the chance to take care of your patient with you.  Please feel free to call me with any questions or concerns.    Sincerely,        Elijah Bonner MD  Electronically Signed on 3/20/2024

## (undated) NOTE — LETTER
7/8/2024      Elijah Bonner MD  Physical Medicine and Rehabilitation  [address] [place] [address], [address]  [facility] [address]  Dept: [phone]  Dept Fax: [phone]        RE: Consultation for Greg Soler        Dear Ayush Montes,    Thank you very much for the opportunity to see your patient.  Attached please find a summary from your patient's recent visit.     I appreciate the chance to take care of your patient with you.  Please feel free to call me with any questions or concerns.    Sincerely,        Elijah Bonner MD  Electronically Signed on 7/8/2024

## (undated) NOTE — LETTER
3/15/2018      Adam Lucia MD  Physical Medicine and Rehabilitation  [address] [address]  Dept: [phone]  Dept Fax: [phone]        RE: Consultation for Elaine Meehan        Dear Silvia Burton,    Thank you very muc

## (undated) NOTE — LETTER
12/13/2023      Gerald Mariana Gonzalez MD  Physical Medicine and Rehabilitation  [address] [address]  Dept: [phone]  Dept Fax: [phone]        RE: Consultation for Lyric Carrillo        Dear Archana Jones,    Thank you very much for the opportunity to see your patient. Attached please find a summary from your patient's recent visit. I appreciate the chance to take care of your patient with you. Please feel free to call me with any questions or concerns. Sincerely,        Jose Eduardo Cisse.  Eva Gonzalez MD  Electronically Signed on 12/13/2023

## (undated) NOTE — MR AVS SNAPSHOT
[facility]  [address], [address] [facility]  [address] [facility] [phone]               Thank you for choosing us for your health care visit with Cici Hodge.  Benedicto Paula MD.  We are glad to serve you and happy to provide you with this summary of you may be held responsible for payment in full if proper authorization is not acquired. Thank You. To schedule a test at any [facility] Scheduling at   [phone].          Referral Details     Referred By    Refe Neck pain on right side  (primary encounter diagnosis)  right C7 radiculopathy  Other osteoarthritis of spine, cervical region  Cervical disc disease: moderate at C3-4 and C5-6    2-3 times per week for 4-6 weeks.   Cervical stabilization with neural mobil family member will be picking up prescription, office must be given name of individual in advance and they must present an ID as well. ? The name of the person picking up your prescription must be documented in your chart.   Scheduling Tests    If your phy He will trial the ibuprofen at night time for the pain to see if this helps. He will follow up in 3 months or sooner if needed.            Allergies as of May 18, 2017     Celebrex [Celecoxib] Rash                Today's Vital Signs     BP Pulse Height W Weight Reduction Maintain normal body weight (body mass index 18.5-24.9 kg/m2)   DASH eating plan Adopt a diet rich in fruits, vegetables, and low fat dairy products with reduced content of saturated and total fat.    Dietary sodium reduction Reduce dietary Don’t forget strength training with weights and resistance Set goals and track your progress   You don’t need to join a gym. Home exercises work great.  Put more priority on exercise in your life                    Visit [facility] online at